# Patient Record
Sex: MALE | Employment: STUDENT | URBAN - METROPOLITAN AREA
[De-identification: names, ages, dates, MRNs, and addresses within clinical notes are randomized per-mention and may not be internally consistent; named-entity substitution may affect disease eponyms.]

---
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
## 2020-02-19 LAB
DEPRECATED S PYO AG THROAT QL EIA: NEGATIVE
SPECIMEN SOURCE: NORMAL
SPECIMEN SOURCE: NORMAL
STREP GROUP A PCR: NORMAL

## 2020-02-19 PROCEDURE — 40001204 ZZHCL STATISTIC STREP A RAPID: Performed by: FAMILY MEDICINE

## 2020-02-19 PROCEDURE — 87081 CULTURE SCREEN ONLY: CPT | Performed by: FAMILY MEDICINE

## 2020-02-19 PROCEDURE — 87077 CULTURE AEROBIC IDENTIFY: CPT | Performed by: FAMILY MEDICINE

## 2020-02-21 LAB
BACTERIA SPEC CULT: ABNORMAL
Lab: ABNORMAL
SPECIMEN SOURCE: ABNORMAL

## 2020-10-13 DIAGNOSIS — U07.1 CLINICAL DIAGNOSIS OF COVID-19: Primary | ICD-10-CM

## 2020-10-13 NOTE — PROGRESS NOTES
The patient was diagnosed with Covid 19 and as part of the [employer] return to play protocol the patient must undergo a Cardiac MRI, Echo, EKG, and tropinin test.  The patient agreed with todays plan.  All question were answered to their satisfaction and in layman's terms.     Artis Long MS, ATC

## 2020-10-14 DIAGNOSIS — U07.1 CLINICAL DIAGNOSIS OF COVID-19: Primary | ICD-10-CM

## 2020-10-21 ENCOUNTER — VIRTUAL VISIT (OUTPATIENT)
Dept: FAMILY MEDICINE | Facility: CLINIC | Age: 20
End: 2020-10-21
Payer: COMMERCIAL

## 2020-10-21 DIAGNOSIS — U07.1 CLINICAL DIAGNOSIS OF COVID-19: Primary | ICD-10-CM

## 2020-10-21 NOTE — LETTER
Date:October 22, 2020      Patient was self referred, no letter generated. Do not send.        [facility] Health Information

## 2020-10-21 NOTE — LETTER
"  10/21/2020      RE: David Sanchez  [address] [facility] [address]       David Sanchez is a 20 year old male who is being evaluated via a billable video visit.      The patient has been notified of following:     \"This video visit will be conducted via a call between you and your physician/provider. We have found that certain health care needs can be provided without the need for an in-person physical exam.  This service lets us provide the care you need with a video conversation.  If a prescription is necessary we can send it directly to your pharmacy.  If lab work is needed we can place an order for that and you can then stop by our lab to have the test done at a later time.    Video visits are billed at different rates depending on your insurance coverage.  Please reach out to your insurance provider with any questions.    If during the course of the call the physician/provider feels a video visit is not appropriate, you will not be charged for this service.\"    Patient has given verbal consent for Video visit? Yes  How would you like to obtain your AVS? MyChart  If you are dropped from the video visit, the video invite should be resent to: Text to cell phone: [phone]  Will anyone else be joining your video visit? No        Video-Visit Details    Type of service:  Video Visit    Video Start Time: 10:31 AM  Video End Time: 1045    Originating Location (pt. Location): Home    Distant Location (provider location):  [facility]     Platform used for Video Visit: Bryon Long MS, ATC      This patient is being evaluated via a billable video visit.               PMH:  Past Medical History:   Diagnosis Date     Concussion     8th grade, 2 weeks to recover        Active problem list:  There is no problem list on file for this patient.      FH:  Family History   Problem Relation Age of Onset     Heart Disease Father      Heart Disease Paternal Grandfather  "       SH:  Social History     Socioeconomic History     Marital status: Single     Spouse name: Not on file     Number of children: Not on file     Years of education: Not on file     Highest education level: Not on file   Occupational History     Not on file   Social Needs     Financial resource strain: Not on file     Food insecurity     Worry: Not on file     Inability: Not on file     Transportation needs     Medical: Not on file     Non-medical: Not on file   Tobacco Use     Smoking status: Never Smoker     Smokeless tobacco: Never Used   Substance and Sexual Activity     Alcohol use: Not Currently     Drug use: Never     Sexual activity: Not on file   Lifestyle     Physical activity     Days per week: Not on file     Minutes per session: Not on file     Stress: Not on file   Relationships     Social connections     Talks on phone: Not on file     Gets together: Not on file     Attends Orthodox service: Not on file     Active member of club or organization: Not on file     Attends meetings of clubs or organizations: Not on file     Relationship status: Not on file     Intimate partner violence     Fear of current or ex partner: Not on file     Emotionally abused: Not on file     Physically abused: Not on file     Forced sexual activity: Not on file   Other Topics Concern     Not on file   Social History Narrative     Not on file       MEDS:  See EMR, reviewed  ALL:  See EMR, reviewed          Video start time:  Video stop:      Sport:golfer    COVID testing:  [place] [facility] [place]  IgG antibody serology:  Saliva antigen:    Date of symptoms onset:none  Possible previous suspect COVID contact exposure:car trip golfers to [place] 10/10, two in car positive    Initial symptoms:  none  Fever or chills:n  Cough:n  Shortness of breath or difficulty breathing:n  Fatigue:n  Muscle or body aches:n  Headache:n  New loss of taste or smell:n  Sore throat:n  Congestion or runny nose:n  Nausea or  "vomiting:nn  Diarrhea:    Current symptoms:none  Fever or chills:n  Cough:n  Shortness of breath or difficulty breathing:n  Fatigue:n  Muscle or body aches:n  Headache:n  New loss of taste or smell:n  Sore throat:n  Congestion or runny nose:n  Nausea or vomiting:n  Diarrhea:n      Objective:    GENERAL: healthy, alert, without distress  EYES: Eyes grossly normal to inspection.   HENT: normal cephalic/atraumatic  NECK: No asymmetry, visible masses or scars  RESP: No audible wheeze, cough, or visible cyanosis.  No visible retractions or increased work of breathing.    SKIN: Visible skin clear. No visible rash, abnormal pigmentation or lesions.  NEURO: Cranial nerves grossly intact.  Mentation and speech appropriate for age.  PSYCH: mentation appears normal, concentration appears appropriate, judgement and insight intact and appearance well groomed      A:  Clinical COVID infection    P: Discussed importance adhering to self-isolation, which means isolating for at least 10 days from the positive test, and then being cleared by a team physician before returning to workouts.  No athletic activity during isolation, while body is working on recovering from the virus.  Isolation for at least 10 days from the onset of symptoms, and at least 24 hours since all symptoms have resolved.  Understanding that eventual return to athletic activity will be a stepwise process, guided by .  There will be studies of cardiac health completed, including EKG, echocardiogram, cardiac MRI, and troponin blood test prior to return to athletic practices and competition.  During isolation, important to focus on nutrition and rest.  Stay in your own room, and use your own bathroom if possible.  Stay away from others in the home with no hugging, kissing, shaking hands.  No visitors.  Clean \"high touch\" surfaces often (doorknobs, counters, handles etc). Cover mouth and nose with mask.  Wash hands and face often, and use soap and " water.  Notify physician or  in the unlikely occurrence of worsening symptoms, such as increasing trouble breathing, pain or pressure in the chest all the time.  Next f/up in clinic will be after post-quarantine testing.              Artie Wilson MD

## 2020-10-21 NOTE — PROGRESS NOTES
"David Sanchez is a 20 year old male who is being evaluated via a billable video visit.      The patient has been notified of following:     \"This video visit will be conducted via a call between you and your physician/provider. We have found that certain health care needs can be provided without the need for an in-person physical exam.  This service lets us provide the care you need with a video conversation.  If a prescription is necessary we can send it directly to your pharmacy.  If lab work is needed we can place an order for that and you can then stop by our lab to have the test done at a later time.    Video visits are billed at different rates depending on your insurance coverage.  Please reach out to your insurance provider with any questions.    If during the course of the call the physician/provider feels a video visit is not appropriate, you will not be charged for this service.\"    Patient has given verbal consent for Video visit? Yes  How would you like to obtain your AVS? MyChart  If you are dropped from the video visit, the video invite should be resent to: Text to cell phone: [phone]  Will anyone else be joining your video visit? No        Video-Visit Details    Type of service:  Video Visit    Video Start Time: 10:31 AM  Video End Time: 1045    Originating Location (pt. Location): Home    Distant Location (provider location):  [facility]     Platform used for Video Visit: Bryon Long MS, ATC      This patient is being evaluated via a billable video visit.               PMH:  Past Medical History:   Diagnosis Date     Concussion     8th grade, 2 weeks to recover        Active problem list:  There is no problem list on file for this patient.      FH:  Family History   Problem Relation Age of Onset     Heart Disease Father      Heart Disease Paternal Grandfather        SH:  Social History     Socioeconomic History     Marital status: Single     Spouse " name: Not on file     Number of children: Not on file     Years of education: Not on file     Highest education level: Not on file   Occupational History     Not on file   Social Needs     Financial resource strain: Not on file     Food insecurity     Worry: Not on file     Inability: Not on file     Transportation needs     Medical: Not on file     Non-medical: Not on file   Tobacco Use     Smoking status: Never Smoker     Smokeless tobacco: Never Used   Substance and Sexual Activity     Alcohol use: Not Currently     Drug use: Never     Sexual activity: Not on file   Lifestyle     Physical activity     Days per week: Not on file     Minutes per session: Not on file     Stress: Not on file   Relationships     Social connections     Talks on phone: Not on file     Gets together: Not on file     Attends Judaism service: Not on file     Active member of club or organization: Not on file     Attends meetings of clubs or organizations: Not on file     Relationship status: Not on file     Intimate partner violence     Fear of current or ex partner: Not on file     Emotionally abused: Not on file     Physically abused: Not on file     Forced sexual activity: Not on file   Other Topics Concern     Not on file   Social History Narrative     Not on file       MEDS:  See EMR, reviewed  ALL:  See EMR, reviewed          Video start time:  Video stop:      Sport:golfer    COVID testing:  [place] test[place]10[place]12/20 [facility] [place]  IgG antibody serology:  Saliva antigen:    Date of symptoms onset:none  Possible previous suspect COVID contact exposure:car trip golfers to [place] 10/10, two in car positive    Initial symptoms:  none  Fever or chills:n  Cough:n  Shortness of breath or difficulty breathing:n  Fatigue:n  Muscle or body aches:n  Headache:n  New loss of taste or smell:n  Sore throat:n  Congestion or runny nose:n  Nausea or vomiting:nn  Diarrhea:    Current symptoms:none  Fever or chills:n  Cough:n  Shortness of breath or difficulty  "breathing:n  Fatigue:n  Muscle or body aches:n  Headache:n  New loss of taste or smell:n  Sore throat:n  Congestion or runny nose:n  Nausea or vomiting:n  Diarrhea:n      Objective:    GENERAL: healthy, alert, without distress  EYES: Eyes grossly normal to inspection.   HENT: normal cephalic/atraumatic  NECK: No asymmetry, visible masses or scars  RESP: No audible wheeze, cough, or visible cyanosis.  No visible retractions or increased work of breathing.    SKIN: Visible skin clear. No visible rash, abnormal pigmentation or lesions.  NEURO: Cranial nerves grossly intact.  Mentation and speech appropriate for age.  PSYCH: mentation appears normal, concentration appears appropriate, judgement and insight intact and appearance well groomed      A:  Clinical COVID infection    P: Discussed importance adhering to self-isolation, which means isolating for at least 10 days from the positive test, and then being cleared by a team physician before returning to workouts.  No athletic activity during isolation, while body is working on recovering from the virus.  Isolation for at least 10 days from the onset of symptoms, and at least 24 hours since all symptoms have resolved.  Understanding that eventual return to athletic activity will be a stepwise process, guided by .  There will be studies of cardiac health completed, including EKG, echocardiogram, cardiac MRI, and troponin blood test prior to return to athletic practices and competition.  During isolation, important to focus on nutrition and rest.  Stay in your own room, and use your own bathroom if possible.  Stay away from others in the home with no hugging, kissing, shaking hands.  No visitors.  Clean \"high touch\" surfaces often (doorknobs, counters, handles etc). Cover mouth and nose with mask.  Wash hands and face often, and use soap and water.  Notify physician or  in the unlikely occurrence of worsening symptoms, such as increasing " trouble breathing, pain or pressure in the chest all the time.  Next f/up in clinic will be after post-quarantine testing.

## 2020-10-23 ENCOUNTER — ANCILLARY PROCEDURE (OUTPATIENT)
Dept: CARDIOLOGY | Facility: CLINIC | Age: 20
End: 2020-10-23
Attending: FAMILY MEDICINE
Payer: COMMERCIAL

## 2020-10-23 DIAGNOSIS — U07.1 CLINICAL DIAGNOSIS OF COVID-19: ICD-10-CM

## 2020-10-23 LAB — TROPONIN I SERPL-MCNC: <0.015 UG/L (ref 0–0.04)

## 2020-10-23 PROCEDURE — 93000 ELECTROCARDIOGRAM COMPLETE: CPT | Performed by: INTERNAL MEDICINE

## 2020-10-23 PROCEDURE — 36415 COLL VENOUS BLD VENIPUNCTURE: CPT | Performed by: PATHOLOGY

## 2020-10-23 PROCEDURE — 93306 TTE W/DOPPLER COMPLETE: CPT | Performed by: INTERNAL MEDICINE

## 2020-10-23 PROCEDURE — 84484 ASSAY OF TROPONIN QUANT: CPT | Performed by: PATHOLOGY

## 2020-10-25 LAB — INTERPRETATION ECG - MUSE: NORMAL

## 2020-10-26 ENCOUNTER — HOSPITAL ENCOUNTER (OUTPATIENT)
Dept: CARDIOLOGY | Facility: CLINIC | Age: 20
Discharge: HOME | End: 2020-10-26
Attending: FAMILY MEDICINE | Admitting: FAMILY MEDICINE
Payer: COMMERCIAL

## 2020-10-26 DIAGNOSIS — U07.1 CLINICAL DIAGNOSIS OF COVID-19: ICD-10-CM

## 2020-10-26 PROCEDURE — 75561 CARDIAC MRI FOR MORPH W/DYE: CPT | Mod: 26 | Performed by: INTERNAL MEDICINE

## 2020-10-26 PROCEDURE — 75561 CARDIAC MRI FOR MORPH W/DYE: CPT

## 2020-10-26 PROCEDURE — 255N000002 HC RX 255 OP 636: Performed by: FAMILY MEDICINE

## 2020-10-26 PROCEDURE — A9585 GADOBUTROL INJECTION: HCPCS | Performed by: FAMILY MEDICINE

## 2020-10-26 RX ORDER — GADOBUTROL 604.72 MG/ML
10 INJECTION INTRAVENOUS ONCE
Status: COMPLETED | OUTPATIENT
Start: 2020-10-26 | End: 2020-10-26

## 2020-10-26 RX ADMIN — GADOBUTROL 10 ML: 604.72 INJECTION INTRAVENOUS at 09:31

## 2020-10-26 NOTE — PROGRESS NOTES
Selam Willard MD  P  [employer]             CORE with T2 mapping    Contrast administered per weight based protocol.

## 2020-10-28 ENCOUNTER — APPOINTMENT (OUTPATIENT)
Dept: ORTHOPEDICS | Facility: CLINIC | Age: 20
End: 2020-10-28
Payer: COMMERCIAL

## 2020-11-09 ENCOUNTER — OFFICE VISIT (OUTPATIENT)
Dept: FAMILY MEDICINE | Facility: CLINIC | Age: 20
End: 2020-11-09
Payer: COMMERCIAL

## 2020-11-09 VITALS
WEIGHT: 174.8 LBS | SYSTOLIC BLOOD PRESSURE: 117 MMHG | HEART RATE: 71 BPM | HEIGHT: 68 IN | BODY MASS INDEX: 26.49 KG/M2 | DIASTOLIC BLOOD PRESSURE: 67 MMHG

## 2020-11-09 DIAGNOSIS — J11.1 INFLUENZA: ICD-10-CM

## 2020-11-09 DIAGNOSIS — U07.1 COVID-19: Primary | ICD-10-CM

## 2020-11-09 ASSESSMENT — MIFFLIN-ST. JEOR: SCORE: 1777.39

## 2020-11-09 NOTE — PROGRESS NOTES
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  He was asymptomatic entirely with COVID.  His positive test was 10/12.  After getting out of isolation and recovering from COVID, he then got sick later.  About 2 weeks ago, he developed illness with fevers, sore throat and swollen lymph nodes.  He was initially told he had Strep but other tests twice were negative.  He was then diagnosed with influenza via nasopharyngeal swab.  He denies being tested for mono.  He states now he is feeling very well.  He occasionally has some phlegm in his throat still but he is eating well.  His throat does not hurt.  His lymph nodes have resolved.  They were in the anterior part of his neck.  Nothing posterior.  He has done the COVID [employer] return to play cardiac testing.  He has not been exercising.     We do not have records of his Influenza testing.  Nothing is in Care Everywhere.  He was treated with Tamiflu.     No current outpatient medications on file.     No current facility-administered medications for this visit.        O:  NAD  /67   Pulse 71   Ht 1.727 m (5' 8\")   Wt 79.3 kg (174 lb 12.8 oz)   BMI 26.58 kg/m    HEENT:  Negative  Neck:  L AC 0.5 cm LAD x1 which is nttp, no other LAD either AC or PC  Lungs; cta  Heart; rrr  Abd:  Benign, soft and nttp, No HSM        Troponin:  WNL    Cardiac MRI:  Normal    EKG:  WNL, bradycardia        Cammie Grey MD  [phone] 10/23/2020       Narrative & Impression     [id number]  [id number]  [id number]  [id number]^TOMMY^DEBBIE^CRISTINA           [facility] [place]  Echocardiography Laboratory  [address] [place] [id number]     Name: NGOZI TAYLOR  MRN: [id number]  : 2000  Study Date: 10/23/2020 08:17 AM  Age: 20 yrs  Gender: Male  Patient Location: [facility]  Reason For Study: Clinical diagnosis of COVID-19  Ordering Physician: DEBBIE SANDERS  Referring " Physician: DEBBIE SANDERS  Performed By: JESSICA Jason     BSA: 1.9 m2  Height: 68 in  Weight: 170 lb  HR: 50  BP: 112/60 mmHg  _____________________________________________________________________________  __        Procedure  Complete Portable Echo Adult. Echocardiogram with two-dimensional, color and  spectral Doppler performed.  _____________________________________________________________________________  __        Interpretation Summary  Global and regional left ventricular function is normal with an EF of 55-60%.  Global right ventricular function is normal.  No significant valvular abnormalities present.  The inferior vena cava was normal in size with preserved respiratory  variability.  No pericardial effusion is present.  _____________________________________________________________________________  __        Left Ventricle  Left ventricular size is normal. Left ventricular wall thickness is normal.  Global and regional left ventricular function is normal with an EF of 55-60%.  Left ventricular diastolic function is normal. No regional wall motion  abnormalities are seen.     Right Ventricle  The right ventricle is normal size. Global right ventricular function is  normal.     Atria  The right atria appears normal. Mild left atrial enlargement is present.     Mitral Valve  The mitral valve is normal. Trace mitral insufficiency is present.        Aortic Valve  Aortic valve is normal in structure and function.     Tricuspid Valve  The tricuspid valve is normal. Trace tricuspid insufficiency is present.     Pulmonic Valve  The pulmonic valve is normal.     Vessels  The aorta root is normal. The inferior vena cava was normal in size with  preserved respiratory variability.     Pericardium  No pericardial effusion is present.        Miscellaneous  No significant valvular abnormalities present.     Compared to Previous Study  Previous study not available for  comparison.  _____________________________________________________________________________  __     MMode/2D Measurements & Calculations  IVSd: 0.80 cm  LVIDd: 5.2 cm  LVIDs: 3.6 cm  LVPWd: 1.1 cm  FS: 30.8 %  LV mass(C)d: 175.3 grams  LV mass(C)dI: 91.9 grams/m2  Ao root diam: 2.7 cm  asc Aorta Diam: 2.7 cm  LVOT diam: 2.3 cm  LVOT area: 4.3 cm2  LA Volume (BP): 74.2 ml     LA Volume Index (BP): 38.8 ml/m2  RWT: 0.42        Doppler Measurements & Calculations  MV E max tristin: 65.0 cm/sec  MV A max tristin: 28.6 cm/sec  MV E/A: 2.3  MV dec slope: 251.4 cm/sec2  MV dec time: 0.26 sec  PA acc time: 0.12 sec  TR max tristin: 164.1 cm/sec  TR max PG: 10.8 mmHg  E/E' av.9  Lateral E/e': 5.2  Medial E/e': 6.6        _____________________________________________________________________________  __        Report approved by: Peewee Abdi 10/23/2020 09:10 AM                               ASSESSMENT:     1.  Status post asymptomatic COVID positivity.   2.  Influenza per patient report.      PLAN:  At this time, David is doing well.  He is doing well and feeling much better after having asymptomatic COVID followed by influenza.  We have reviewed his cardiac testing which is all normal.  I think it is appropriate for him to do the bike test with his ATC and, if that goes well, begin his return to play.  He understands that if he is to have any symptoms or setbacks he is to let his ATC know.  If he develops any left upper quadrant symptoms, he is also to let his  know.  He understands that should do a gradual return to play as he has been out almost an entire month at this point in time.  His return to play might be slower than others due to extended time out of athletics.  Sulma Kasper ATC, was present for the entire appointment.       Johanna Ferreira MD, CAQ, FACSM, CCD  [facility]  Sports Medicine and Bone Health  Team Physician;  Athletics

## 2020-11-09 NOTE — LETTER
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  He was asymptomatic entirely with COVID.  His positive test was 10/12.  After getting out of isolation and recovering from COVID, he then got sick later.  About 2 weeks ago, he developed illness with fevers, sore throat and swollen lymph nodes.  He was initially told he had Strep but other tests twice were negative.  He was then diagnosed with influenza via nasopharyngeal swab.  He denies being tested for mono.  He states now he is feeling very well.  He occasionally has some phlegm in his throat still but he is eating well.  His throat does not hurt.  His lymph nodes have resolved.  They were in the anterior part of his neck.  Nothing posterior.  He has done the COVID [employer] return to play cardiac testing.  He has not been exercising.     We do not have records of his Influenza testing.  Nothing is in Care Everywhere.  He was treated with Tamiflu.     No current outpatient medications on file.     No current facility-administered medications for this visit.        O:  NAD  /67   Pulse 71   Ht 1.727 m (5' 8\")   Wt 79.3 kg (174 lb 12.8 oz)   BMI 26.58 kg/m    HEENT:  Negative  Neck:  L AC 0.5 cm LAD x1 which is nttp, no other LAD either AC or PC  Lungs; cta  Heart; rrr  Abd:  Benign, soft and nttp, No HSM        Troponin:  WNL    Cardiac MRI:  Normal    EKG:  WNL, bradycardia        Cammie Grey MD  [phone] 10/23/2020       Narrative & Impression     [id number]  [id number]  [id number]  [id number]^TOMMY^DEBBIE^[facility],[place]  Echocardiography Laboratory  [address] [place] [id number]     Name: NGOZI TAYLOR  MRN: [id number]  : 2000  Study Date: 10/23/2020 08:17 AM  Age: 20 yrs  Gender: Male  Patient Location: [facility]  Reason For Study: " Clinical diagnosis of COVID-19  Ordering Physician: DEBBIE SANDERS  Referring Physician: DEBBIE SANDERS  Performed By: JESSICA Jason     BSA: 1.9 m2  Height: 68 in  Weight: 170 lb  HR: 50  BP: 112/60 mmHg  _____________________________________________________________________________  __        Procedure  Complete Portable Echo Adult. Echocardiogram with two-dimensional, color and  spectral Doppler performed.  _____________________________________________________________________________  __        Interpretation Summary  Global and regional left ventricular function is normal with an EF of 55-60%.  Global right ventricular function is normal.  No significant valvular abnormalities present.  The inferior vena cava was normal in size with preserved respiratory  variability.  No pericardial effusion is present.  _____________________________________________________________________________  __        Left Ventricle  Left ventricular size is normal. Left ventricular wall thickness is normal.  Global and regional left ventricular function is normal with an EF of 55-60%.  Left ventricular diastolic function is normal. No regional wall motion  abnormalities are seen.     Right Ventricle  The right ventricle is normal size. Global right ventricular function is  normal.     Atria  The right atria appears normal. Mild left atrial enlargement is present.     Mitral Valve  The mitral valve is normal. Trace mitral insufficiency is present.        Aortic Valve  Aortic valve is normal in structure and function.     Tricuspid Valve  The tricuspid valve is normal. Trace tricuspid insufficiency is present.     Pulmonic Valve  The pulmonic valve is normal.     Vessels  The aorta root is normal. The inferior vena cava was normal in size with  preserved respiratory variability.     Pericardium  No pericardial effusion is present.        Miscellaneous  No significant valvular abnormalities present.     Compared to Previous  Study  Previous study not available for comparison.  _____________________________________________________________________________  __     MMode/2D Measurements & Calculations  IVSd: 0.80 cm  LVIDd: 5.2 cm  LVIDs: 3.6 cm  LVPWd: 1.1 cm  FS: 30.8 %  LV mass(C)d: 175.3 grams  LV mass(C)dI: 91.9 grams/m2  Ao root diam: 2.7 cm  asc Aorta Diam: 2.7 cm  LVOT diam: 2.3 cm  LVOT area: 4.3 cm2  LA Volume (BP): 74.2 ml     LA Volume Index (BP): 38.8 ml/m2  RWT: 0.42        Doppler Measurements & Calculations  MV E max tristin: 65.0 cm/sec  MV A max tristin: 28.6 cm/sec  MV E/A: 2.3  MV dec slope: 251.4 cm/sec2  MV dec time: 0.26 sec  PA acc time: 0.12 sec  TR max tristin: 164.1 cm/sec  TR max PG: 10.8 mmHg  E/E' av.9  Lateral E/e': 5.2  Medial E/e': 6.6        _____________________________________________________________________________  __        Report approved by: Peewee Abdi 10/23/2020 09:10 AM                               ASSESSMENT:     1.  Status post asymptomatic COVID positivity.   2.  Influenza per patient report.      PLAN:  At this time, David is doing well.  He is doing well and feeling much better after having asymptomatic COVID followed by influenza.  We have reviewed his cardiac testing which is all normal.  I think it is appropriate for him to do the bike test with his ATC and, if that goes well, begin his return to play.  He understands that if he is to have any symptoms or setbacks he is to let his ATC know.  If he develops any left upper quadrant symptoms, he is also to let his  know.  He understands that should do a gradual return to play as he has been out almost an entire month at this point in time.  His return to play might be slower than others due to extended time out of athletics.  Sulma Kasper ATC, was present for the entire appointment.       Johanna Ferreira MD, CAQ, FACSM, CCD  [facility]  [facility] Medicine [facility]  Team Physician;  Athletics

## 2021-03-12 DIAGNOSIS — B00.89 HERPES GLADIATORUM: Primary | ICD-10-CM

## 2021-03-12 RX ORDER — VALACYCLOVIR HYDROCHLORIDE 1 G/1
1000 TABLET, FILM COATED ORAL DAILY
Qty: 30 TABLET | Refills: 3 | Status: SHIPPED | OUTPATIENT
Start: 2021-03-12 | End: 2022-04-21

## 2021-10-10 ENCOUNTER — HEALTH MAINTENANCE LETTER (OUTPATIENT)
Age: 21
End: 2021-10-10

## 2022-04-21 DIAGNOSIS — B00.89 HERPES GLADIATORUM: ICD-10-CM

## 2022-04-21 RX ORDER — VALACYCLOVIR HYDROCHLORIDE 1 G/1
1000 TABLET, FILM COATED ORAL DAILY
Qty: 30 TABLET | Refills: 3 | Status: SHIPPED | OUTPATIENT
Start: 2022-04-21

## 2022-09-18 ENCOUNTER — HEALTH MAINTENANCE LETTER (OUTPATIENT)
Age: 22
End: 2022-09-18

## 2023-01-29 ENCOUNTER — HEALTH MAINTENANCE LETTER (OUTPATIENT)
Age: 23
End: 2023-01-29

## 2023-05-22 ENCOUNTER — OFFICE VISIT (OUTPATIENT)
Dept: FAMILY MEDICINE | Facility: CLINIC | Age: 23
End: 2023-05-22
Payer: COMMERCIAL

## 2023-05-22 VITALS
WEIGHT: 170 LBS | BODY MASS INDEX: 26.68 KG/M2 | SYSTOLIC BLOOD PRESSURE: 132 MMHG | DIASTOLIC BLOOD PRESSURE: 77 MMHG | HEART RATE: 98 BPM | HEIGHT: 67 IN | TEMPERATURE: 98.5 F

## 2023-05-22 DIAGNOSIS — J06.9 UPPER RESPIRATORY TRACT INFECTION, UNSPECIFIED TYPE: Primary | ICD-10-CM

## 2023-05-22 DIAGNOSIS — J02.9 PHARYNGITIS, UNSPECIFIED ETIOLOGY: ICD-10-CM

## 2023-05-22 RX ORDER — AZITHROMYCIN 250 MG/1
TABLET, FILM COATED ORAL
Qty: 6 TABLET | Refills: 0 | Status: SHIPPED | OUTPATIENT
Start: 2023-05-22 | End: 2023-05-27

## 2023-05-22 NOTE — PROGRESS NOTES
HISTORY OF PRESENT ILLNESS  Mr. Sanchez is a pleasant 22 year old year old male who presents to clinic today with the following:  What problem are you here for? URI, sore throat, congestion  With some chills at night x 1 week      MEDICAL HISTORY  There is no problem list on file for this patient.      Current Outpatient Medications   Medication Sig Dispense Refill     valACYclovir (VALTREX) 1000 mg tablet Take 1 tablet (1,000 mg) by mouth daily 30 tablet 3       No Known Allergies    Family History   Problem Relation Age of Onset     Heart Disease Father      Heart Disease Paternal Grandfather      Social History     Socioeconomic History     Marital status: Single     Spouse name: None     Number of children: None     Years of education: None     Highest education level: None   Tobacco Use     Smoking status: Never     Smokeless tobacco: Never   Substance and Sexual Activity     Alcohol use: Not Currently     Drug use: Never       Additional medical/Social/Surgical histories reviewed in [facility] and updated as appropriate.     REVIEW OF SYSTEMS (5/22/2023)  10 point ROS of systems including Constitutional, Eyes, Respiratory, Cardiovascular, Gastroenterology, Genitourinary, Integumentary, Musculoskeletal, Psychiatric, Allergic/Immunologic were all negative except for pertinent positives noted in my HPI.     PHYSICAL EXAM  VSS  Exam:  Constitutional: healthy, alert and no distress  Head: Normocephalic. No masses, lesions, tenderness or abnormalities  Neck: Neck supple. No adenopathy. Thyroid symmetric, normal size,, Carotids without bruits.  ENT: ENT exam abnormal, has bilateral anterior cervical neck nodes and some maxillary sinus tenderness with red, erythematous tonsillar beds  Cardiovascular: negative, PMI normal. No lifts, heaves, or thrills. RRR. No murmurs, clicks gallops or rub  Respiratory: negative, Percussion normal. Good diaphragmatic excursion. Lungs clear  Gastrointestinal: Abdomen soft, non-tender. BS  normal. No masses, organomegaly  Skin: no suspicious lesions or rashes  Neurologic: Gait normal. Reflexes normal and symmetric. Sensation grossly WNL.  Psychiatric: mentation appears normal and affect normal/bright  Hematologic/Lymphatic/Immunologic: Normal cervical lymph nodes  ASSESSMENT & PLAN  21 yo male with URI and pharyngitis with sinusitis symptoms    Discussed supportive cares  Tylenol and ibuprofen PRN  RX given for z pack  F/u in 1 week if not improving      Appropriate PPE was utilized for prevention of spread of Covid-19.  Nick Najera MD, CAQSM

## 2023-05-22 NOTE — LETTER
5/22/2023      RE: David Sanchez  [address] [facility] [address]     Dear Colleague,    Thank you for referring your patient, David Sanchez, to the [facility]. Please see a copy of my visit note below.    HISTORY OF PRESENT ILLNESS  Mr. Sanchez is a pleasant 22 year old year old male who presents to clinic today with the following:  What problem are you here for? URI, sore throat, congestion  With some chills at night x 1 week      MEDICAL HISTORY  There is no problem list on file for this patient.      Current Outpatient Medications   Medication Sig Dispense Refill     valACYclovir (VALTREX) 1000 mg tablet Take 1 tablet (1,000 mg) by mouth daily 30 tablet 3       No Known Allergies    Family History   Problem Relation Age of Onset     Heart Disease Father      Heart Disease Paternal Grandfather      Social History     Socioeconomic History     Marital status: Single     Spouse name: None     Number of children: None     Years of education: None     Highest education level: None   Tobacco Use     Smoking status: Never     Smokeless tobacco: Never   Substance and Sexual Activity     Alcohol use: Not Currently     Drug use: Never       Additional medical/Social/Surgical histories reviewed in [facility] and updated as appropriate.     REVIEW OF SYSTEMS (5/22/2023)  10 point ROS of systems including Constitutional, Eyes, Respiratory, Cardiovascular, Gastroenterology, Genitourinary, Integumentary, Musculoskeletal, Psychiatric, Allergic/Immunologic were all negative except for pertinent positives noted in my HPI.     PHYSICAL EXAM  VSS  Exam:  Constitutional: healthy, alert and no distress  Head: Normocephalic. No masses, lesions, tenderness or abnormalities  Neck: Neck supple. No adenopathy. Thyroid symmetric, normal size,, Carotids without bruits.  ENT: ENT exam abnormal, has bilateral anterior cervical neck nodes and some maxillary sinus tenderness with red, erythematous tonsillar  beds  Cardiovascular: negative, PMI normal. No lifts, heaves, or thrills. RRR. No murmurs, clicks gallops or rub  Respiratory: negative, Percussion normal. Good diaphragmatic excursion. Lungs clear  Gastrointestinal: Abdomen soft, non-tender. BS normal. No masses, organomegaly  Skin: no suspicious lesions or rashes  Neurologic: Gait normal. Reflexes normal and symmetric. Sensation grossly WNL.  Psychiatric: mentation appears normal and affect normal/bright  Hematologic/Lymphatic/Immunologic: Normal cervical lymph nodes  ASSESSMENT & PLAN  23 yo male with URI and pharyngitis with sinusitis symptoms    Discussed supportive cares  Tylenol and ibuprofen PRN  RX given for z pack  F/u in 1 week if not improving      Appropriate PPE was utilized for prevention of spread of Covid-19.  Nick Najera MD, CAQSM        Again, thank you for allowing me to participate in the care of your patient.      Sincerely,    Nick Najera MD

## 2023-06-02 ENCOUNTER — OFFICE VISIT (OUTPATIENT)
Dept: FAMILY MEDICINE | Facility: CLINIC | Age: 23
End: 2023-06-02
Payer: COMMERCIAL

## 2023-06-02 ENCOUNTER — LAB (OUTPATIENT)
Dept: LAB | Facility: CLINIC | Age: 23
End: 2023-06-02
Payer: COMMERCIAL

## 2023-06-02 VITALS
SYSTOLIC BLOOD PRESSURE: 124 MMHG | HEIGHT: 67 IN | WEIGHT: 169.2 LBS | HEART RATE: 71 BPM | DIASTOLIC BLOOD PRESSURE: 78 MMHG | BODY MASS INDEX: 26.56 KG/M2

## 2023-06-02 DIAGNOSIS — R59.0 LYMPHADENOPATHY OF HEAD AND NECK REGION: ICD-10-CM

## 2023-06-02 DIAGNOSIS — R53.83 OTHER FATIGUE: Primary | ICD-10-CM

## 2023-06-02 DIAGNOSIS — Z77.120 CONTACT WITH AND (SUSPECTED) EXPOSURE TO MOLD (TOXIC): ICD-10-CM

## 2023-06-02 DIAGNOSIS — R53.83 OTHER FATIGUE: ICD-10-CM

## 2023-06-02 DIAGNOSIS — J02.9 PHARYNGITIS, UNSPECIFIED ETIOLOGY: ICD-10-CM

## 2023-06-02 LAB
ALBUMIN SERPL BCG-MCNC: 4.4 G/DL (ref 3.5–5.2)
ALP SERPL-CCNC: 88 U/L (ref 40–129)
ALT SERPL W P-5'-P-CCNC: 259 U/L (ref 10–50)
ANION GAP SERPL CALCULATED.3IONS-SCNC: 8 MMOL/L (ref 7–15)
AST SERPL W P-5'-P-CCNC: 171 U/L (ref 10–50)
BASOPHILS # BLD AUTO: 0.2 10E3/UL (ref 0–0.2)
BASOPHILS NFR BLD AUTO: 2 %
BILIRUB SERPL-MCNC: 0.3 MG/DL
BUN SERPL-MCNC: 14.1 MG/DL (ref 6–20)
CALCIUM SERPL-MCNC: 9.8 MG/DL (ref 8.6–10)
CHLORIDE SERPL-SCNC: 101 MMOL/L (ref 98–107)
CREAT SERPL-MCNC: 0.76 MG/DL (ref 0.67–1.17)
DEPRECATED CALCIDIOL+CALCIFEROL SERPL-MC: 38 UG/L (ref 20–75)
DEPRECATED HCO3 PLAS-SCNC: 29 MMOL/L (ref 22–29)
EOSINOPHIL # BLD AUTO: 0.2 10E3/UL (ref 0–0.7)
EOSINOPHIL NFR BLD AUTO: 2 %
ERYTHROCYTE [DISTWIDTH] IN BLOOD BY AUTOMATED COUNT: 12.8 % (ref 10–15)
GFR SERPL CREATININE-BSD FRML MDRD: >90 ML/MIN/1.73M2
GLUCOSE SERPL-MCNC: 92 MG/DL (ref 70–99)
HCT VFR BLD AUTO: 44.3 % (ref 40–53)
HGB BLD-MCNC: 14.7 G/DL (ref 13.3–17.7)
IMM GRANULOCYTES # BLD: 0.1 10E3/UL
IMM GRANULOCYTES NFR BLD: 1 %
LYMPHOCYTES # BLD AUTO: 7.7 10E3/UL (ref 0.8–5.3)
LYMPHOCYTES NFR BLD AUTO: 58 %
MCH RBC QN AUTO: 29.1 PG (ref 26.5–33)
MCHC RBC AUTO-ENTMCNC: 33.2 G/DL (ref 31.5–36.5)
MCV RBC AUTO: 88 FL (ref 78–100)
MONOCYTES # BLD AUTO: 1.2 10E3/UL (ref 0–1.3)
MONOCYTES NFR BLD AUTO: 9 %
MONOCYTES NFR BLD AUTO: POSITIVE %
NEUTROPHILS # BLD AUTO: 3.6 10E3/UL (ref 1.6–8.3)
NEUTROPHILS NFR BLD AUTO: 28 %
NRBC # BLD AUTO: 0 10E3/UL
NRBC BLD AUTO-RTO: 0 /100
PLAT MORPH BLD: NORMAL
PLATELET # BLD AUTO: 210 10E3/UL (ref 150–450)
POTASSIUM SERPL-SCNC: 4.7 MMOL/L (ref 3.4–5.3)
PROT SERPL-MCNC: 7.5 G/DL (ref 6.4–8.3)
RBC # BLD AUTO: 5.06 10E6/UL (ref 4.4–5.9)
RBC MORPH BLD: NORMAL
SODIUM SERPL-SCNC: 138 MMOL/L (ref 136–145)
TSH SERPL DL<=0.005 MIU/L-ACNC: 2.96 UIU/ML (ref 0.3–4.2)
WBC # BLD AUTO: 12.9 10E3/UL (ref 4–11)

## 2023-06-02 PROCEDURE — 99000 SPECIMEN HANDLING OFFICE-LAB: CPT | Mod: DMND | Performed by: PATHOLOGY

## 2023-06-02 PROCEDURE — 36415 COLL VENOUS BLD VENIPUNCTURE: CPT | Performed by: PATHOLOGY

## 2023-06-02 PROCEDURE — 86308 HETEROPHILE ANTIBODY SCREEN: CPT | Mod: DMND | Performed by: PREVENTIVE MEDICINE

## 2023-06-02 PROCEDURE — 82306 VITAMIN D 25 HYDROXY: CPT | Mod: DMND | Performed by: PREVENTIVE MEDICINE

## 2023-06-02 PROCEDURE — 80050 GENERAL HEALTH PANEL: CPT | Performed by: PATHOLOGY

## 2023-06-02 RX ORDER — PREDNISONE 20 MG/1
40 TABLET ORAL DAILY
Qty: 8 TABLET | Refills: 0 | Status: SHIPPED | OUTPATIENT
Start: 2023-06-02

## 2023-06-02 RX ORDER — AMOXICILLIN 500 MG/1
500 CAPSULE ORAL 2 TIMES DAILY
Qty: 14 CAPSULE | Refills: 0 | Status: SHIPPED | OUTPATIENT
Start: 2023-06-02

## 2023-06-02 NOTE — LETTER
6/2/2023      RE: Daivd Sanchez  [address] [facility] [address]     Dear Colleague,    Thank you for referring your patient, David Sanchez, to the [facility]. Please see a copy of my visit note below.    HISTORY OF PRESENT ILLNESS  Mr. Sanchez is a pleasant 22 year old year old male who presents to clinic today with the following:  What problem are you here for? Recurrent pharyngitis  Found out that the house he lives in has mold  Had some improvement after z pack 2 weeks ago with pharyngitis symptoms, but his fatigue level has persisted  Feels swollen lymph nodes in neck area  Worse again  No fevers or chills      MEDICAL HISTORY  There is no problem list on file for this patient.      Current Outpatient Medications   Medication Sig Dispense Refill     valACYclovir (VALTREX) 1000 mg tablet Take 1 tablet (1,000 mg) by mouth daily 30 tablet 3       No Known Allergies    Family History   Problem Relation Age of Onset     Heart Disease Father      Heart Disease Paternal Grandfather      Social History     Socioeconomic History     Marital status: Single     Spouse name: None     Number of children: None     Years of education: None     Highest education level: None   Tobacco Use     Smoking status: Never     Smokeless tobacco: Never   Substance and Sexual Activity     Alcohol use: Not Currently     Drug use: Never       Additional medical/Social/Surgical [facility] and updated as appropriate.     REVIEW OF SYSTEMS (6/2/2023)  10 point ROS of systems including Constitutional, Eyes, Respiratory, Cardiovascular, Gastroenterology, Genitourinary, Integumentary, Musculoskeletal, Psychiatric, Allergic/Immunologic were all negative except for pertinent positives noted in my HPI.     PHYSICAL EXAM  VSS  Exam:  Constitutional: healthy, alert and no distress  Head: Normocephalic. No masses, lesions, tenderness or abnormalities  Neck: Neck supple. Has adenopathy. Thyroid  symmetric, normal size,, Carotids without bruits.  ENT: ENT exam abnormal, has bilateral anterior cervical lyphadenopathy, red, swollen turbinates, red, swollen tonsils, with small exudates  Cardiovascular: negative, PMI normal. No lifts, heaves, or thrills. RRR. No murmurs, clicks gallops or rub  Respiratory: negative, Percussion normal. Good diaphragmatic excursion. Lungs clear  Gastrointestinal: Abdomen soft, non-tender. BS normal. No masses, organomegaly    Neurologic: Gait normal. Reflexes normal and symmetric. Sensation grossly WNL.  Psychiatric: mentation appears normal and affect normal/bright  Hematologic/Lymphatic/Immunologic: Normal cervical lymph nodes    ASSESSMENT & PLAN  23 yo male with pharyngitis, mold exposure, postnasal drip, concern for allergic reaction, vs. Mono    Discussed and ordered labs for further workup  RX given for amoxicillin  Cont. flonase  clariton daily  F/u after labs      Appropriate PPE was utilized for prevention of spread of Covid-19.  Nick Najera MD, [facility]          Again, thank you for allowing me to participate in the care of your patient.      Sincerely,    Nick Najera MD

## 2023-06-02 NOTE — PROGRESS NOTES
HISTORY OF PRESENT ILLNESS  Mr. Sanchez is a pleasant 22 year old year old male who presents to clinic today with the following:  What problem are you here for? Recurrent pharyngitis  Found out that the house he lives in has mold  Had some improvement after z pack 2 weeks ago with pharyngitis symptoms, but his fatigue level has persisted  Feels swollen lymph nodes in neck area  Worse again  No fevers or chills      MEDICAL HISTORY  There is no problem list on file for this patient.      Current Outpatient Medications   Medication Sig Dispense Refill     valACYclovir (VALTREX) 1000 mg tablet Take 1 tablet (1,000 mg) by mouth daily 30 tablet 3       No Known Allergies    Family History   Problem Relation Age of Onset     Heart Disease Father      Heart Disease Paternal Grandfather      Social History     Socioeconomic History     Marital status: Single     Spouse name: None     Number of children: None     Years of education: None     Highest education level: None   Tobacco Use     Smoking status: Never     Smokeless tobacco: Never   Substance and Sexual Activity     Alcohol use: Not Currently     Drug use: Never       Additional medical/Social/Surgical histories reviewed in [facility] and updated as appropriate.     REVIEW OF SYSTEMS (6/2/2023)  10 point ROS of systems including Constitutional, Eyes, Respiratory, Cardiovascular, Gastroenterology, Genitourinary, Integumentary, Musculoskeletal, Psychiatric, Allergic/Immunologic were all negative except for pertinent positives noted in my HPI.     PHYSICAL EXAM  VSS  Exam:  Constitutional: healthy, alert and no distress  Head: Normocephalic. No masses, lesions, tenderness or abnormalities  Neck: Neck supple. Has adenopathy. Thyroid symmetric, normal size,, Carotids without bruits.  ENT: ENT exam abnormal, has bilateral anterior cervical lyphadenopathy, red, swollen turbinates, red, swollen tonsils, with small exudates  Cardiovascular: negative, PMI normal. No lifts, heaves, or  thrills. RRR. No murmurs, clicks gallops or rub  Respiratory: negative, Percussion normal. Good diaphragmatic excursion. Lungs clear  Gastrointestinal: Abdomen soft, non-tender. BS normal. No masses, organomegaly    Neurologic: Gait normal. Reflexes normal and symmetric. Sensation grossly WNL.  Psychiatric: mentation appears normal and affect normal/bright  Hematologic/Lymphatic/Immunologic: Normal cervical lymph nodes    ASSESSMENT & PLAN  21 yo male with pharyngitis, mold exposure, postnasal drip, concern for allergic reaction, vs. Mono    Discussed and ordered labs for further workup  RX given for amoxicillin  rx given for prednisone    Cont. flonase  clariton daily  F/u after labs      Appropriate PPE was utilized for prevention of spread of Covid-19.  Nick Najera MD, CAQSM

## 2024-02-25 ENCOUNTER — HEALTH MAINTENANCE LETTER (OUTPATIENT)
Age: 24
End: 2024-02-25

## 2025-03-15 ENCOUNTER — HEALTH MAINTENANCE LETTER (OUTPATIENT)
Age: 25
End: 2025-03-15